# Patient Record
Sex: FEMALE | Race: WHITE | NOT HISPANIC OR LATINO
[De-identification: names, ages, dates, MRNs, and addresses within clinical notes are randomized per-mention and may not be internally consistent; named-entity substitution may affect disease eponyms.]

---

## 2022-04-06 ENCOUNTER — APPOINTMENT (OUTPATIENT)
Dept: CARDIOLOGY | Facility: CLINIC | Age: 87
End: 2022-04-06
Payer: MEDICARE

## 2022-04-06 VITALS
DIASTOLIC BLOOD PRESSURE: 70 MMHG | HEART RATE: 51 BPM | TEMPERATURE: 98.4 F | SYSTOLIC BLOOD PRESSURE: 122 MMHG | WEIGHT: 129 LBS | BODY MASS INDEX: 23.74 KG/M2 | HEIGHT: 62 IN

## 2022-04-06 DIAGNOSIS — Z00.00 ENCOUNTER FOR GENERAL ADULT MEDICAL EXAMINATION W/OUT ABNORMAL FINDINGS: ICD-10-CM

## 2022-04-06 PROCEDURE — 99204 OFFICE O/P NEW MOD 45 MIN: CPT

## 2022-04-06 PROCEDURE — 93000 ELECTROCARDIOGRAM COMPLETE: CPT

## 2022-04-06 RX ORDER — METOPROLOL TARTRATE 25 MG/1
25 TABLET, FILM COATED ORAL
Qty: 90 | Refills: 3 | Status: ACTIVE | COMMUNITY

## 2022-04-06 RX ORDER — ADHESIVE TAPE 3"X 2.3 YD
50 MCG TAPE, NON-MEDICATED TOPICAL DAILY
Refills: 0 | Status: ACTIVE | COMMUNITY

## 2022-04-06 RX ORDER — ASPIRIN 81 MG
81 TABLET, DELAYED RELEASE (ENTERIC COATED) ORAL DAILY
Refills: 0 | Status: ACTIVE | COMMUNITY

## 2022-04-06 RX ORDER — SIMVASTATIN 20 MG/1
20 TABLET, FILM COATED ORAL
Qty: 30 | Refills: 0 | Status: ACTIVE | COMMUNITY

## 2022-04-06 RX ORDER — LISINOPRIL 5 MG/1
5 TABLET ORAL DAILY
Refills: 0 | Status: ACTIVE | COMMUNITY

## 2022-04-06 NOTE — HISTORY OF PRESENT ILLNESS
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 Hodge at [facility], she cannot recall the date.\par Patient denies a history of MI,angina, CHF, arrhythmia, syncope , DM, Familial history of heart disease or cigarette smoking.

## 2022-04-06 NOTE — PHYSICAL EXAM
[No Acute Distress] : no acute distress [No Xanthelasma] : no xanthelasma [Normal Venous Pressure] : normal venous pressure [No Carotid Bruit] : no carotid bruit [Normal S1, S2] : normal S1, S2 [No Rub] : no rub [Murmur] : murmur [Clear Lung Fields] : clear lung fields [Good Air Entry] : good air entry [No Respiratory Distress] : no respiratory distress  [Soft] : abdomen soft [Non Tender] : non-tender [Normal Gait] : normal gait [No Edema] : no edema [No Cyanosis] : no cyanosis [No Rash] : no rash [Moves all extremities] : moves all extremities [No Focal Deficits] : no focal deficits [Normal Speech] : normal speech [Alert and Oriented] : alert and oriented [de-identified] : Alert and oriented pleasant elderly female  [de-identified] : Grade I/VI systolic murmur at RSB

## 2022-04-06 NOTE — DISCUSSION/SUMMARY
[FreeTextEntry1] : Patient was advised to maintain her cardiac medications.\par Obtain a copy of her prior cardiac testing from her Cardiologist, Dr. Landaverde\par Patient was advised to maintain a low fat, low cholesterol diet.\par 2D echo doppler\par Fasting lipid panel CBC BMP and hepatic panel.\par Continue with risk factor modification.\par RV in 2 weeks.\par

## 2022-04-06 NOTE — REASON FOR VISIT
[FreeTextEntry1] : Pleasant 89 year old WF who was previously followed by her Cardiologist, Dr. Landaverde presents for an initial Cardiology evaluation after her Cardiologist recently retired.\par She denies any symptoms of chest pain or angina. She still shops and cooks for her self and lives with her son on [place]par She is still driving her car on short distances.

## 2022-04-20 ENCOUNTER — APPOINTMENT (OUTPATIENT)
Dept: CARDIOLOGY | Facility: CLINIC | Age: 87
End: 2022-04-20

## 2022-04-25 ENCOUNTER — APPOINTMENT (OUTPATIENT)
Dept: CARDIOLOGY | Facility: CLINIC | Age: 87
End: 2022-04-25

## 2022-04-28 ENCOUNTER — APPOINTMENT (OUTPATIENT)
Dept: CARDIOLOGY | Facility: CLINIC | Age: 87
End: 2022-04-28
Payer: MEDICARE

## 2022-04-28 VITALS
SYSTOLIC BLOOD PRESSURE: 104 MMHG | DIASTOLIC BLOOD PRESSURE: 62 MMHG | WEIGHT: 124 LBS | HEART RATE: 54 BPM | BODY MASS INDEX: 22.82 KG/M2 | TEMPERATURE: 98.2 F | HEIGHT: 62 IN

## 2022-04-28 PROCEDURE — 99214 OFFICE O/P EST MOD 30 MIN: CPT

## 2022-04-28 PROCEDURE — 93000 ELECTROCARDIOGRAM COMPLETE: CPT

## 2022-04-28 NOTE — HISTORY OF PRESENT ILLNESS
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 Hodge at [facility], she cannot recall the date.\par Patient denies a history of MI,angina, CHF, arrhythmia, syncope , DM, Familial history of heart disease or cigarette smoking.

## 2022-04-28 NOTE — REASON FOR VISIT
[FreeTextEntry1] : Pleasant 89 year old WF who was previously followed by her Cardiologist, Dr. Landaverde presents for an initial Cardiology evaluation after her Cardiologist recently retired.\par She denies any symptoms of chest pain or angina. She still shops and cooks for her self and lives with her son [place]par She is still driving her car on short distances.\par The patient was scheduled to have an echo doppler, but cancelled her appointment twice.

## 2022-04-28 NOTE — PHYSICAL EXAM
[No Acute Distress] : no acute distress [No Xanthelasma] : no xanthelasma [Normal Venous Pressure] : normal venous pressure [No Carotid Bruit] : no carotid bruit [Normal S1, S2] : normal S1, S2 [No Rub] : no rub [Murmur] : murmur [Clear Lung Fields] : clear lung fields [Good Air Entry] : good air entry [No Respiratory Distress] : no respiratory distress  [Soft] : abdomen soft [Non Tender] : non-tender [Normal Gait] : normal gait [No Edema] : no edema [No Cyanosis] : no cyanosis [No Rash] : no rash [Moves all extremities] : moves all extremities [No Focal Deficits] : no focal deficits [Normal Speech] : normal speech [Alert and Oriented] : alert and oriented [de-identified] : Alert and oriented pleasant elderly female  [de-identified] : Grade I/VI systolic murmur at RSB

## 2022-04-28 NOTE — ASSESSMENT
[FreeTextEntry1] : Hypertensive heart disease\par Hyperlipidemia\par Mild MR\par Mild AI\par Mild TR\par History of a prior TIA

## 2022-05-17 ENCOUNTER — APPOINTMENT (OUTPATIENT)
Dept: CARDIOLOGY | Facility: CLINIC | Age: 87
End: 2022-05-17
Payer: MEDICARE

## 2022-05-17 PROCEDURE — 93306 TTE W/DOPPLER COMPLETE: CPT

## 2022-05-19 ENCOUNTER — APPOINTMENT (OUTPATIENT)
Dept: CARDIOLOGY | Facility: CLINIC | Age: 87
End: 2022-05-19

## 2022-10-27 ENCOUNTER — APPOINTMENT (OUTPATIENT)
Dept: CARDIOLOGY | Facility: CLINIC | Age: 87
End: 2022-10-27

## 2022-10-27 VITALS
SYSTOLIC BLOOD PRESSURE: 128 MMHG | DIASTOLIC BLOOD PRESSURE: 60 MMHG | BODY MASS INDEX: 23.55 KG/M2 | HEIGHT: 62 IN | WEIGHT: 128 LBS | HEART RATE: 59 BPM

## 2022-10-27 DIAGNOSIS — I10 ESSENTIAL (PRIMARY) HYPERTENSION: ICD-10-CM

## 2022-10-27 DIAGNOSIS — I34.0 NONRHEUMATIC MITRAL (VALVE) INSUFFICIENCY: ICD-10-CM

## 2022-10-27 DIAGNOSIS — I35.1 NONRHEUMATIC AORTIC (VALVE) INSUFFICIENCY: ICD-10-CM

## 2022-10-27 DIAGNOSIS — E78.5 HYPERLIPIDEMIA, UNSPECIFIED: ICD-10-CM

## 2022-10-27 PROCEDURE — 99214 OFFICE O/P EST MOD 30 MIN: CPT | Mod: 25

## 2022-10-27 PROCEDURE — 93000 ELECTROCARDIOGRAM COMPLETE: CPT

## 2022-10-27 RX ORDER — METOPROLOL SUCCINATE 25 MG/1
25 TABLET, EXTENDED RELEASE ORAL
Qty: 90 | Refills: 0 | Status: ACTIVE | COMMUNITY
Start: 2022-08-24

## 2022-10-27 RX ORDER — ASPIRIN 81 MG/1
81 TABLET, COATED ORAL
Qty: 90 | Refills: 0 | Status: ACTIVE | COMMUNITY
Start: 2022-08-05

## 2022-10-27 RX ORDER — CALCIUM CARBONATE/VITAMIN D3 600MG-5MCG
600-5 TABLET ORAL
Qty: 60 | Refills: 0 | Status: ACTIVE | COMMUNITY
Start: 2022-06-16

## 2022-10-27 NOTE — DISCUSSION/SUMMARY
[FreeTextEntry1] : Patient was advised to maintain her cardiac medications.\par Obtained a copy of her prior cardiac testing from her Cardiologist, Dr. Landaverde\par Patient was advised to maintain a low fat, low cholesterol diet.\par 2D echo doppler results were reviewed with the patient in detail. A copy was provided to her.\par Fasting lipid panel CBC BMP and hepatic panel.\par Continue with risk factor modification.\par RV in 6 months.\par

## 2022-10-27 NOTE — REASON FOR VISIT
[FreeTextEntry1] : Pleasant 89 year old WF who was previously followed by her Cardiologist, Dr. Landaverde presents for an initial Cardiology evaluation after her Cardiologist recently retired.\par She denies any symptoms of chest pain or angina. She still shops and cooks for her self and lives with her son [place]par She is still driving her car on short distances.\par The patient had a recent TTE and is here for the results.

## 2022-10-27 NOTE — HISTORY OF PRESENT ILLNESS
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 Hodge at [facility] [facility], she cannot recall the date.\par Patient denies a history of MI,angina, CHF, arrhythmia, syncope , DM, Familial history of heart disease or cigarette smoking.

## 2022-10-27 NOTE — PHYSICAL EXAM
[No Acute Distress] : no acute distress [No Xanthelasma] : no xanthelasma [Normal Venous Pressure] : normal venous pressure [No Carotid Bruit] : no carotid bruit [Normal S1, S2] : normal S1, S2 [No Rub] : no rub [Murmur] : murmur [Clear Lung Fields] : clear lung fields [Good Air Entry] : good air entry [No Respiratory Distress] : no respiratory distress  [Soft] : abdomen soft [Non Tender] : non-tender [Normal Gait] : normal gait [No Edema] : no edema [No Cyanosis] : no cyanosis [No Rash] : no rash [Moves all extremities] : moves all extremities [No Focal Deficits] : no focal deficits [Normal Speech] : normal speech [Alert and Oriented] : alert and oriented [de-identified] : Alert and oriented pleasant elderly female  [de-identified] : Grade I/VI systolic murmur at RSB

## 2022-10-27 NOTE — ASSESSMENT
[FreeTextEntry1] : Hypertensive heart disease\par Hyperlipidemia\par Mild MR\par Mild AI\par Mild TR\par Dilated aortic root\par History of a prior TIA

## 2023-04-27 ENCOUNTER — APPOINTMENT (OUTPATIENT)
Dept: CARDIOLOGY | Facility: CLINIC | Age: 88
End: 2023-04-27